# Patient Record
Sex: FEMALE | Race: WHITE | NOT HISPANIC OR LATINO | Employment: FULL TIME | URBAN - METROPOLITAN AREA
[De-identification: names, ages, dates, MRNs, and addresses within clinical notes are randomized per-mention and may not be internally consistent; named-entity substitution may affect disease eponyms.]

---

## 2021-03-30 ENCOUNTER — AMBULATORY - HEALTHEAST (OUTPATIENT)
Dept: NURSING | Facility: CLINIC | Age: 61
End: 2021-03-30

## 2021-04-20 ENCOUNTER — AMBULATORY - HEALTHEAST (OUTPATIENT)
Dept: NURSING | Facility: CLINIC | Age: 61
End: 2021-04-20

## 2021-05-28 ENCOUNTER — RECORDS - HEALTHEAST (OUTPATIENT)
Dept: ADMINISTRATIVE | Facility: CLINIC | Age: 61
End: 2021-05-28

## 2021-12-08 ENCOUNTER — APPOINTMENT (OUTPATIENT)
Dept: RADIOLOGY | Facility: HOSPITAL | Age: 61
End: 2021-12-08
Attending: STUDENT IN AN ORGANIZED HEALTH CARE EDUCATION/TRAINING PROGRAM
Payer: COMMERCIAL

## 2021-12-08 ENCOUNTER — HOSPITAL ENCOUNTER (EMERGENCY)
Facility: HOSPITAL | Age: 61
Discharge: HOME | End: 2021-12-08
Attending: EMERGENCY MEDICINE | Admitting: EMERGENCY MEDICINE
Payer: COMMERCIAL

## 2021-12-08 ENCOUNTER — APPOINTMENT (OUTPATIENT)
Dept: CT IMAGING | Facility: HOSPITAL | Age: 61
End: 2021-12-08
Attending: STUDENT IN AN ORGANIZED HEALTH CARE EDUCATION/TRAINING PROGRAM
Payer: COMMERCIAL

## 2021-12-08 VITALS
HEIGHT: 68 IN | OXYGEN SATURATION: 100 % | BODY MASS INDEX: 30.77 KG/M2 | DIASTOLIC BLOOD PRESSURE: 90 MMHG | HEART RATE: 62 BPM | RESPIRATION RATE: 18 BRPM | TEMPERATURE: 97.4 F | SYSTOLIC BLOOD PRESSURE: 194 MMHG | WEIGHT: 203 LBS

## 2021-12-08 DIAGNOSIS — S46.911A STRAIN OF RIGHT SHOULDER, INITIAL ENCOUNTER: ICD-10-CM

## 2021-12-08 DIAGNOSIS — S09.90XA CLOSED HEAD INJURY, INITIAL ENCOUNTER: ICD-10-CM

## 2021-12-08 DIAGNOSIS — S01.81XA FACIAL LACERATION, INITIAL ENCOUNTER: ICD-10-CM
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
## 2021-12-08 PROBLEM — H93.13 SUBJECTIVE TINNITUS OF BOTH EARS: Status: ACTIVE | Noted: 2017-03-20

## 2021-12-08 PROBLEM — I10 HTN (HYPERTENSION): Status: ACTIVE | Noted: 2021-12-08

## 2021-12-08 PROBLEM — M47.816 SPONDYLOSIS OF LUMBAR SPINE: Status: ACTIVE | Noted: 2021-01-04

## 2021-12-08 PROBLEM — M15.0 PRIMARY OSTEOARTHRITIS INVOLVING MULTIPLE JOINTS: Status: ACTIVE | Noted: 2021-01-04

## 2021-12-08 PROCEDURE — 272N000047 HC ADHESIVE DERMABOND SKIN

## 2021-12-08 PROCEDURE — 250N000013 HC RX MED GY IP 250 OP 250 PS 637: Performed by: STUDENT IN AN ORGANIZED HEALTH CARE EDUCATION/TRAINING PROGRAM

## 2021-12-08 PROCEDURE — 99284 EMERGENCY DEPT VISIT MOD MDM: CPT | Mod: 25

## 2021-12-08 PROCEDURE — 73030 X-RAY EXAM OF SHOULDER: CPT | Mod: RT

## 2021-12-08 PROCEDURE — 70450 CT HEAD/BRAIN W/O DYE: CPT

## 2021-12-08 PROCEDURE — 12011 RPR F/E/E/N/L/M 2.5 CM/<: CPT

## 2021-12-08 RX ORDER — OXYCODONE AND ACETAMINOPHEN 5; 325 MG/1; MG/1
1 TABLET ORAL ONCE
Status: COMPLETED | OUTPATIENT
Start: 2021-12-08 | End: 2021-12-08

## 2021-12-08 RX ORDER — IBUPROFEN 600 MG/1
600 TABLET, FILM COATED ORAL ONCE
Status: COMPLETED | OUTPATIENT
Start: 2021-12-08 | End: 2021-12-08

## 2021-12-08 RX ADMIN — IBUPROFEN 600 MG: 600 TABLET, FILM COATED ORAL at 17:41

## 2021-12-08 RX ADMIN — OXYCODONE HYDROCHLORIDE AND ACETAMINOPHEN 1 TABLET: 5; 325 TABLET ORAL at 17:41

## 2021-12-08 ASSESSMENT — ENCOUNTER SYMPTOMS
SHORTNESS OF BREATH: 0
VOMITING: 0
NAUSEA: 0
NECK PAIN: 0
WEAKNESS: 0
BACK PAIN: 0

## 2021-12-08 ASSESSMENT — MIFFLIN-ST. JEOR: SCORE: 1539.3

## 2021-12-08 NOTE — ED PROVIDER NOTES
ED Provider In Triage Note  [facility]  Encounter Date: Dec 8, 2021    No chief complaint on file.    Brief HPI:   Diane J Schoenthaler is a 61 year old female presenting to the Emergency Department with a chief complaint of a mechanical fall that occurred about 30 min prior to arrival    Tripped and fell on the sidewalk injuring right shoulder and hematoma to right eyebrow  Not on blood thinners  Denies LOC    Brief Physical Exam:  There were no vitals taken for this visit.  General: Non-toxic appearing  HEENT: Hematoma to right brow, small lac to lateral brow, hemostatic  Resp: No respiratory distress  Abdomen: Non-peritoneal  Neuro: Alert, oriented, answers questions appropriately  Psych: Behavior appropriate  MSK: Mild tenderness with palpation over the lateral deltoid, passive ROM of shoulder somewhat limited due to pain. No sisi deformities or stepoffs. Clavicle appears intact.     Plan Initiated in Triage:  CT head, XR shoulder, pain meds    PIT Dispo:   Return to lobby while awaiting workup and ED bed availability    Damion Herrera MD on 12/8/2021 at 4:49 PM    Patient was evaluated by the Physician in Triage due to a limitation of available rooms in the Emergency Department. A plan of care was discussed based on the information obtained on the initial evaluation and patient was consuled to return back to the Emergency Department lobby after this initial evalutaiton until results were obtained or a room became available in the Emergency Department. Patient was counseled not to leave prior to receiving the results of their workup.      Damion Herrera MD  12/08/21 9790

## 2021-12-08 NOTE — ED TRIAGE NOTES
Patient reports that she tripped on sidewalk- fell forward, co right shoulder/ above right eye pain.  Note hematoma to right forehead, denies LOC, no blood thinners.

## 2021-12-09 NOTE — DISCHARGE INSTRUCTIONS
You were seen in the Emergency Department today after a fall.     Your CT scan and x-ray both looked good.  Your cut was closed with Steri-Strips and glue.  Both of these will wear off.  For the next couple days, you should avoid scrubbing at the wound and also bacitracin or Vaseline because these things will make the glue come off before it should.    For Pain:  - You can take 600mg of Ibuprofen (Motrin, Advil) by mouth with food every 6-8 hours (no more than 3200mg in 24hrs).    - You may also take 650-1000mg of Acetaminophen (Tylenol) along with the Ibuprofen. Please do not use more than 3000 mg in a 24 hour period. Tylenol is an effective drug when taken at the prescribed dosages but can cause bodily injury including liver damage if taken too often or at too high of dose.  - You can take one or the other every 3 hours while awake (such that each is taken every 6 hours). For example, if you take Tylenol when you get home then you would take ibuprofen 3 hours later followed by another dose of Tylenol 3 hours after that. Write down the times you are taking both medications to ensure appropriate time in between doses.     You may shower and let water run over the wound, but do not scrub the wound. Scars take one year to fully heal. Use sunscreen on the scar for the next 12 months to improve healing and final appearance of the scar.      Please return to the ER if you experience confusion, worsening pain, inability to keep food/fluids down, red streaking around the wound, and/or for any other new or concerning symptoms, otherwise please follow up with your primary doctor for recheck.     And also sending you with the information for the sports medicine team.  You can follow-up with them if your shoulder pain does not improve.    Below is some information you might find useful.     Thank you for choosing [facility]. It was a pleasure taking care of you today!  - Dr. Leonor Myers

## 2021-12-09 NOTE — ED PROVIDER NOTES
EMERGENCY DEPARTMENT ENCOUNTER      NAME: Diane J Schoenthaler  YOB: 1960  MRN: [id number]      FINAL IMPRESSION  1. Fall, initial encounter    2. Closed head injury, initial encounter    3. Facial laceration, initial encounter    4. Strain of right shoulder, initial encounter        MEDICAL DECISION MAKING   Pertinent Labs & Imaging studies reviewed. (See chart for details)    Diane J Schoenthaler is a 61 year old female who presents for evaluation after mechanical fall.  Patient believes that she tripped over a dog as she was walking in her house, she was not paying close attention.  She attempted to break her fall with her left arm/shoulder but none work-up striking her forehead on the ground.  There was no loss of consciousness and she was able to get up on her own.  Since following, patient has developed swelling and ecchymosis about her right eye and also has a small laceration in the eyebrow area.  She also complained of left-sided shoulder/arm pain.  Patient was feeling well prior to falling and denies presyncopal symptoms.  No recent signs or symptoms of infection/illness.  She is not on a blood thinner.  Vitals on arrival stable and reassuring.  Remainder of history and physical exam, as below.    Patient was initially evaluated in triage, as we did not have any beds available in the back of the department.  Given concern for possible fracture or intracranial injury, CT head was ordered.  X-ray of left shoulder was also obtained given patient's complaints of pain.  Fortunately, both of these imaging studies showed no evidence of acute injury.  I met with the patient to obtain history and perform initial physical exam.  She has no midline tenderness to suggest spinous process fracture or cord compromise and no signs or symptoms to suggest syncope as etiology of fall.  Patient received a dose of Tylenol prior to my assessment and was feeling better.  We discussed options for further management  of symptoms as well as laceration.  I offered sutures versus Steri-Strips/Dermabond and patient elected to pursue the latter.  Tetanus is up-to-date.    Patient was moved into hallway bed.  ERT irrigated laceration and wound was closed with Steri-Strips and Dermabond.  Patient tolerated this procedure well and after was eager and ready to go home.  She has ambulatory without assistance and tolerating p.o. without difficulty.  I did offer a sling for comfort but she declined and felt comfortable with plan for discharge and follow-up with primary care provider and sports medicine as needed for recheck.  I encouraged her to use Tylenol/ibuprofen for pain and also discussed wound management.    The importance of close follow up was discussed. We reviewed warning signs and symptoms, and I instructed Ms. Schoenthaler to return to the emergency department immediately if she develops any new or worsening symptoms. I provided additional verbal discharge instructions. Ms. Schoenthaler expressed understanding and agreement with this plan of care, her questions were answered, and she was discharged in stable condition.         ED COURSE  6:35 PM Met with patient for initial interview and exam. Discussed plan of care.  7:38 PM Preformed laceration repair. I discussed the plan for discharge with the patient, and patient is agreeable.  We discussed supportive cares at home and reasons for return to the ER including new or worsening symptoms - all questions and concerns addressed.  Patient to be discharged by RN.      MEDICATIONS GIVEN IN THE ED  Medications   oxyCODONE-acetaminophen (PERCOCET) 5-325 MG per tablet 1 tablet (1 tablet Oral Given 12/8/21 1741)   ibuprofen (ADVIL/MOTRIN) tablet 600 mg (600 mg Oral Given 12/8/21 1741)       NEW PRESCRIPTIONS STARTED AT TODAY'S VISIT  There are no discharge medications for this patient.         =================================================================    Chief Complaint   Patient  "presents with     Fall     Head Injury         HPI:    Patient information was obtained from: Patient    Use of : N/A     Diane J Schoenthaler is a 61 year old female with a PMH of hypertension who presents mechanical fall.    Today at 1600 (3 hours ago), patient reports she was walking outside when she got distracted and had a mechanical fall, hitting her head on the sidewalk. Patient denies loss of consciousness and states she was able to get up right after. She sustained a 1 cm laceration over her lateral right eyebrow. She mentions having pain to her right shoulder and right arm, which is provoked with movement. Patient denies vision changes, neck pain, back pain, left arm pain. She denies any recent illness and states she otherwise feels well. She denies dental misalignment, weakness, vomiting, nausea, SOB, or any other complaints at this time.     Per chart, last tdap 12/2016.    RELEVANT HISTORY, MEDICATIONS, & ALLERGIES   Past medical history, surgical history, family history, medications, and allergies reviewed and pertinent noted in HPI. See end of note for comprehensive list.    REVIEW OF SYSTEMS:  Review of Systems   Eyes: Negative for visual disturbance.   Respiratory: Negative for shortness of breath.    Gastrointestinal: Negative for nausea and vomiting.   Musculoskeletal: Negative for back pain and neck pain.        Positive pain to right shoulder and arm   Skin:        Positive 1 cm laceration over lateral right eyebrow   Neurological: Negative for weakness.        Negative LOC   All other systems reviewed and are negative.      PHYSICAL EXAM:    Vitals: BP (!) 194/90   Pulse 62   Temp 97.4  F (36.3  C) (Temporal)   Resp 18   Ht 1.727 m (5' 8\")   Wt 92.1 kg (203 lb)   SpO2 100%   BMI 30.87 kg/m    General: Awake, interactive. No acute distress.  HENT: Ecchymosis and edema about right orbit with mild TTP. ~1 cm laceration to lateral aspect of right eyebrow with small amount of " oozing. No other bony deformities or tenderness with palpation of facial bones/scalp. No epistaxis. Oropharynx clear of obstruction. No malocclusion.  Eyes: Pupils equal, round and reactive bilaterally. EOMs intact. Conjunctiva clear.   Neck: Full AROM. No midline C-spine tenderness.  Cardiovascular: Regular rate and rhythm. Peripheral pulses 2+ bilaterally.  Chest/Lungs: Normal work of breathing. Lung sounds clear and equal throughout, no wheezes or crackles. No chest wall tenderness, crepitus, or external signs of trauma.  Abdomen: Soft, nontender, nondistended.  Back: No thoracic or lumbar spine tenderness.   MSK: Mild diffuse TTP about right shoulder without external signs of trauma. Limited ROM of shoulder 2/2 to pain. Strength and sensation intact RUE. Normal ROM of left upper and bilateral lower extremities and no tenderness with palpation of major joints or long bones.   Skin: Warm, well-perfused. No lacerations or significant abrasions.   Neuro: A&O x3. Cranial nerves grossly intact. Ambulatory without assistance.     RADIOLOGY  XR Shoulder Right G/E 3 Views   Final Result   IMPRESSION: Normal joint spaces and alignment. No fracture.       Head CT w/o contrast   Final Result   IMPRESSION:   1.  No acute intracranial abnormality.   2.  Right facial/periorbital soft tissue contusion.            PROCEDURES  PROCEDURE: Laceration Repair   INDICATIONS: Laceration   PROCEDURE PROVIDER: Leonor Myers MD   SITE: Right lateral [facility]   TYPE/SIZE: simple, clean and no foreign body visualized  1 cm (total length)   FUNCTIONAL ASSESSMENT: Distal sensation, circulation and motor intact   MEDICATION: NA   PREPARATION: irrigation with Normal saline   DEBRIDEMENT: no debridement   CLOSURE:  Wound was closed in   Steri-Strips and Dermabond (medical glue)    Total number of sutures/staples placed: 0       Comprehensive outline of EPIC chart Hx  PAST MEDICAL HISTORY    History reviewed. No pertinent past medical  history.  History reviewed. No pertinent surgical history.    CURRENT MEDICATIONS    No current outpatient medications    ALLERGIES    No Known Allergies    FAMILY HISTORY    History reviewed. No pertinent family history.    SOCIAL HISTORY    Social History     Socioeconomic History     Marital status: Single     Spouse name: Not on file     Number of children: Not on file     Years of education: Not on file     Highest education level: Not on file   Occupational History     Not on file   Tobacco Use     Smoking status: Not on file     Smokeless tobacco: Not on file   Substance and Sexual Activity     Alcohol use: Not on file     Drug use: Not on file     Sexual activity: Not on file   Other Topics Concern     Not on file   Social History Narrative     Not on file       I, Dmaion Weaver, am serving as a scribe to document services personally performed by Dr. Loenor Myers based on my observation and the provider's statements to me. I, Leonor Myers MD attest that Damion Weaver is acting in a scribe capacity, has observed my performance of the services and has documented them in accordance with my direction.    Leonor Myers M.D.  Emergency Medicine  [facility] EMERGENCY DEPARTMENT  [facility] [address]  [phone]  Dept: [phone]       Leonor Myers MD  12/08/21 4727

## 2022-01-05 ENCOUNTER — TRANSFERRED RECORDS (OUTPATIENT)
Dept: HEALTH INFORMATION MANAGEMENT | Facility: CLINIC | Age: 62
End: 2022-01-05
Payer: COMMERCIAL

## 2022-01-15 ENCOUNTER — HEALTH MAINTENANCE LETTER (OUTPATIENT)
Age: 62
End: 2022-01-15

## 2022-12-26 ENCOUNTER — HEALTH MAINTENANCE LETTER (OUTPATIENT)
Age: 62
End: 2022-12-26

## 2023-04-16 ENCOUNTER — HEALTH MAINTENANCE LETTER (OUTPATIENT)
Age: 63
End: 2023-04-16

## 2024-02-04 ENCOUNTER — HEALTH MAINTENANCE LETTER (OUTPATIENT)
Age: 64
End: 2024-02-04

## 2024-06-23 ENCOUNTER — HEALTH MAINTENANCE LETTER (OUTPATIENT)
Age: 64
End: 2024-06-23

## 2024-09-17 ENCOUNTER — ANCILLARY PROCEDURE (OUTPATIENT)
Dept: MAMMOGRAPHY | Facility: HOSPITAL | Age: 64
End: 2024-09-17
Attending: NURSE PRACTITIONER
Payer: COMMERCIAL

## 2024-09-17 DIAGNOSIS — Z12.31 VISIT FOR SCREENING MAMMOGRAM: ICD-10-CM

## 2024-09-17 PROCEDURE — 77063 BREAST TOMOSYNTHESIS BI: CPT

## 2025-02-02 ENCOUNTER — HEALTH MAINTENANCE LETTER (OUTPATIENT)
Age: 65
End: 2025-02-02

## 2025-05-21 ENCOUNTER — HOSPITAL ENCOUNTER (OUTPATIENT)
Dept: ULTRASOUND IMAGING | Facility: HOSPITAL | Age: 65
Discharge: HOME | End: 2025-05-21
Attending: FAMILY MEDICINE
Payer: COMMERCIAL

## 2025-05-21 DIAGNOSIS — R22.1 LOCALIZED SWELLING, MASS AND LUMP, NECK: ICD-10-CM

## 2025-05-21 PROCEDURE — 76536 US EXAM OF HEAD AND NECK: CPT

## 2025-06-24 ENCOUNTER — HOSPITAL ENCOUNTER (OUTPATIENT)
Dept: CT IMAGING | Facility: HOSPITAL | Age: 65
Discharge: HOME | End: 2025-06-24
Attending: INTERNAL MEDICINE
Payer: COMMERCIAL

## 2025-06-24 DIAGNOSIS — R22.1 LOCALIZED SWELLING, MASS AND LUMP, NECK: ICD-10-CM

## 2025-06-24 PROCEDURE — 250N000011 HC RX IP 250 OP 636

## 2025-06-24 PROCEDURE — 70491 CT SOFT TISSUE NECK W/DYE: CPT

## 2025-06-24 PROCEDURE — 250N000009 HC RX 250

## 2025-06-24 RX ORDER — IOPAMIDOL 755 MG/ML
90 INJECTION, SOLUTION INTRAVASCULAR ONCE
Status: COMPLETED | OUTPATIENT
Start: 2025-06-24 | End: 2025-06-24

## 2025-06-24 RX ADMIN — SODIUM CHLORIDE 100 ML: 9 INJECTION, SOLUTION INTRAVENOUS at 07:38

## 2025-06-24 RX ADMIN — IOPAMIDOL 90 ML: 755 INJECTION, SOLUTION INTRAVENOUS at 07:38
